# Patient Record
Sex: FEMALE | Race: WHITE | NOT HISPANIC OR LATINO | Employment: FULL TIME | URBAN - METROPOLITAN AREA
[De-identification: names, ages, dates, MRNs, and addresses within clinical notes are randomized per-mention and may not be internally consistent; named-entity substitution may affect disease eponyms.]

---

## 2022-10-19 ENCOUNTER — TRANSFERRED RECORDS (OUTPATIENT)
Dept: HEALTH INFORMATION MANAGEMENT | Facility: CLINIC | Age: 57
End: 2022-10-19

## 2022-10-19 LAB
ANION GAP SERPL CALC-SCNC: 7 MMOL/L (ref 5–15)
BUN SERPL-MCNC: 14 MG/DL (ref 8–23)
CALCIUM (EXTERNAL): 9.3 MG/DL (ref 8.5–10.5)
CHLORIDE (EXTERNAL): 104 MMOL/L (ref 98–107)
CO2 (EXTERNAL): 30 MMOL/L (ref 23–32)
CREATININE (EXTERNAL): 0.41 MG/DL (ref 0.7–1.2)
GFR ESTIMATED (EXTERNAL): >60 ML/MIN/1.73M2
GFR ESTIMATED (IF AFRICAN AMERICAN) (EXTERNAL): ABNORMAL ML/MIN/1.73M2
GLUCOSE (EXTERNAL): 182 MG/DL (ref 60–99)
HBA1C MFR BLD: 9.4 %
POTASSIUM (EXTERNAL): 4.2 MMOL/L (ref 3.5–5.1)
SODIUM (EXTERNAL): 141 MMOL/L (ref 136–145)

## 2023-01-16 ENCOUNTER — TRANSFERRED RECORDS (OUTPATIENT)
Dept: HEALTH INFORMATION MANAGEMENT | Facility: CLINIC | Age: 58
End: 2023-01-16

## 2023-01-16 LAB — HBA1C MFR BLD: 7.4 % (ref 4–5.6)

## 2023-01-23 ENCOUNTER — TRANSFERRED RECORDS (OUTPATIENT)
Dept: HEALTH INFORMATION MANAGEMENT | Facility: CLINIC | Age: 58
End: 2023-01-23

## 2023-01-26 DIAGNOSIS — E11.29 TYPE 2 DIABETES MELLITUS WITH OTHER DIABETIC KIDNEY COMPLICATION (H): Primary | ICD-10-CM

## 2023-01-26 RX ORDER — LISINOPRIL 2.5 MG/1
2.5 TABLET ORAL DAILY
COMMUNITY

## 2023-02-01 ENCOUNTER — HOSPITAL ENCOUNTER (OUTPATIENT)
Dept: EDUCATION SERVICES | Facility: HOSPITAL | Age: 58
Discharge: HOME | End: 2023-02-01
Attending: NURSE PRACTITIONER | Admitting: NURSE PRACTITIONER
Payer: COMMERCIAL

## 2023-02-01 ENCOUNTER — TRANSFERRED RECORDS (OUTPATIENT)
Dept: HEALTH INFORMATION MANAGEMENT | Facility: CLINIC | Age: 58
End: 2023-02-01

## 2023-02-01 VITALS
WEIGHT: 184.1 LBS | DIASTOLIC BLOOD PRESSURE: 74 MMHG | RESPIRATION RATE: 16 BRPM | BODY MASS INDEX: 30.67 KG/M2 | HEIGHT: 65 IN | HEART RATE: 87 BPM | OXYGEN SATURATION: 98 % | SYSTOLIC BLOOD PRESSURE: 124 MMHG

## 2023-02-01 DIAGNOSIS — E11.29 TYPE 2 DIABETES MELLITUS WITH OTHER DIABETIC KIDNEY COMPLICATION (H): ICD-10-CM

## 2023-02-01 LAB — RETINOPATHY: NORMAL

## 2023-02-01 PROCEDURE — G0108 DIAB MANAGE TRN  PER INDIV: HCPCS

## 2023-02-01 ASSESSMENT — PAIN SCALES - GENERAL
PAINLEVEL: MODERATE PAIN (5)
PAINLEVEL: EXTREME PAIN (9)

## 2023-02-01 NOTE — LETTER
"    2/1/2023    Luba Mace PA-C      RE: Alysha Galeas  [address] [facility] [address]        Diabetes Self-Management Education & Support    Presents for: Individual review    Type of Service: In Person Visit    Assessment Type:   ASSESSMENT:  Sonia, 57 year old, comes to [facility], for education updates. Has been since 2005-6 since last education with a diabetes educator. Most interest with diet, reviewed carbs and meal recommendation. Sonia would like more in depth education with Diabetes Dietician- appt set up today as pt requests. Referal for insulin adjustment. Upon review, continue current doses for now. Need additional readings for recommendations.     A1C in October 10/19/2023. Most recent 1/23 was 7.4%.  Recently in ICU for LOC. Psychogenic nonepileptic seizure. Encephalopathy, metabolic.   On FMLA, .  . 1 biological child. 2 step children.   MH- major depressive disorder, anxiety. Has  professional support to est 2/8/2023. Is currently on short term disability.  Recently had upper and lower scopes. Has a \"stomach emptying test\" next week.      Tiffani-En-Y 1987 (per notes from [facility], sent for scanning).  Wt Readings from Last 10 Encounters:   02/01/23 83.5 kg (184 lb 1.6 oz)     Levemir 24 units daily.   Fiasp with meal time per correction:   <150- no insulin  151-200 3 units  201-250  5 Units   251-300 8 units  301-350 10 units  351-400 12 units  >400 15 units     Has taken Tresiba in the past, didn't seem to control glucose as well as Levemir- per patient. Tried Trulicity, Actos, MOORE-doesn't remember which medication. Has had Metformin when first diagnoised- hasn't been taking for quite a while.     Monitoring: no meter today, forgot to bring today. FBG this morning was 124. Interested in Freestyle Russell, pt will look into zay compatibility. [facility] RN will send order for patient once determined. Concerned for cost, provided e-voucher [employer] Has occasional lows, keeps " "candies and butterscotch chips close.     Eye exam- had today. Was told \"Background retinopathy or stage 1\". Returns 7/26/2023.  Neuropathy in feet and hands. Areas on torso is numb. Has pain in feet.    Activity- working with PT. Has physical limitations and pain in feet.     /74. On ASA. No Statin- reaction      Allergies   Allergen Reactions     Fentanyl Shortness Of Breath     Morphine Shortness Of Breath     Atorvastatin Muscle Pain (Myalgia)     Statins [Hmg-Coa-R Inhibitors] Swelling     Swelling of lip/tongue/throat     Diazepam Anxiety     Patient's most recent No results found for: A1C, HEMOGLOBINA1  is not meeting goal of <7.0  1/23/2023: 7.5%. Down from 9.4% October 2022.     DRC RN will follow up with patient next week.     Diabetes knowledge and skills assessment:   Patient is knowledgeable in diabetes management concepts related to: Healthy Eating, Monitoring and Taking Medication  Continue education with the following diabetes management concepts: Healthy Eating, Being Active, Monitoring, Taking Medication, Problem Solving, Reducing Risks and Healthy Coping  Based on learning assessment above, most appropriate setting for further diabetes education would be: Individual setting.      PLAN    Monitoring:  A1C review:  Your A1C was 7.4 on 1/23/23. Goal is less than 7.5%  Next A1C: After April 23.     Check blood sugars 3-4x/day. Fasting in the morning or 2 hours after your largest meal are good times to check. Bedtime.   Target blood sugar: Fasting or before meals target is . 2 hours after meal <180.      CGM: Russell 2 vs Russell 3. Checks zay compatibility there are different apps for Russell 2 and Russell 3  Check pharmacy availability.      Let me know which you prefer and I will send in the order for you.      E-voucher   [phone]   Ask for e-voucher for the Russell 2 or 3. They will email you the voucher. Print and bring to the pharmacy.      Vouchers are good for 1 year. Will need to call " January for the next year to request a new voucher.      Medications:   Continue: Levemir and Fiasp.      Healthy Eating:  Limit higher carbohydrate foods such as: rice, breads, cereal, pasta, sweets. Avoid sugary drinks.  Good snack examples: meat, cheese, cottage cheese, nuts, hard boiled egg, veggies, fruit/berries, yogurt (Greek yogurt/protein).      Activity/Exercise:   Increase exercise as tolerated, even multiple short times during your day helps. - chair exercises as tolerated.   Adult goal is 150 minutes/week =  30 minutes 5 days of the week.   Weight: 184#     Foot exam: yearly.   [facility] requested.       Follow up:    DRC RN to follow up next week to review plan and glucose.  Dietitian: Pt set up with Elvia 3/1/2023 at 1030 am for MNT.      Please bring your meter to your appointment.   If you have any questions or concerns, please call me at [phone] or send [email or web address] message.     Topics to cover at upcoming visits: Healthy Eating, Being Active, Monitoring, Taking Medication, Problem Solving, Reducing Risks and Healthy Coping  See Care Plan for co-developed, patient-state behavior change goals.  AVS provided for patient today.    Education Materials Provided:  My Meal Plan- review of diet    SUBJECTIVE/OBJECTIVE:  Presents for: Individual review  Accompanied by: Self  Diabetes education in the past 24mo: No (2005-06? last education)  Focus of Visit: Other (wondering if any changes in diabetes management, diet.)  Diabetes type: Type 2  Date of diagnosis: 2000  Disease course: Getting harder to manage  How confident are you filling out medical forms by yourself:: Somewhat  Diabetes management related comments/concerns: hasn't had dm ed 2005-6  Transportation concerns: No  Difficulty affording diabetes medication?: No  Difficulty affording diabetes testing supplies?: No  Other concerns:: Glasses  Cultural Influences/Ethnic Background:  Not  or  "//    Diabetes Symptoms & Complications:  Fatigue: Yes  Neuropathy: Yes (going up legs, to mid-calf. area on back R side below shoulder/down wraps under R breast/side.right hand-feels like 3 fingers electric bolts/lightenting stabbing pain . thumbs - tingles.  toes numb. little toe not move. feet feels like walking on nails.)  Polydipsia: Sometimes (improved since A1C is down.)  Polyphagia: No  Polyuria: Sometimes (when sugars higher and drinking more water.)  Visual change: Yes (eye exam done, today)  Slow healing wounds: No (numbness in feet. ba on scalp has medicaiton for. checks feet.)  Symptom course: Stable  Weight trend: Fluctuating  Complications assessed today?: Yes  Autonomic neuropathy: Other (being worked up currently.)  CVA: No  Heart disease: No (mom had heart disease. paternal gma stroke)  Nephropathy: No  Peripheral neuropathy: Yes (mother-)  Peripheral Vascular Disease: No  Retinopathy: Yes (exam today. [facility].)    Patient Problem List an at d Family Medical History reviewed for relevant medical history, current medical status, and diabetes risk factors.    Vitals:  /74   Pulse 87   Resp 16   Ht 1.645 m (5' 4.75\")   Wt 83.5 kg (184 lb 1.6 oz)   SpO2 98%   BMI 30.87 kg/m    Estimated body mass index is 30.87 kg/m  as calculated from the following:    Height as of this encounter: 1.645 m (5' 4.75\").    Weight as of this encounter: 83.5 kg (184 lb 1.6 oz).   Last 3 BP:   BP Readings from Last 3 Encounters:   02/01/23 124/74       History   Smoking Status     Never   Smokeless Tobacco     Never       Labs:  No results found for: A1C, HEMOGLOBINA1  No results found for: GLC  No results found for: LDL  No results found for: HDL]  No results found for: GFRESTIMATED  No results found for: GFRESTBLACK  No results found for: CR  No results found for: MICROALBUMIN  Healthy Eating:  Healthy Eating Assessed Today: Yes  Cultural/Jainism diet restrictions?: No  Meal planning/habits: Carb " counting, Avoiding sweets, Low salt (carbs- 45-60.)  How many times a week on average do you eat food made away from home (restaurant/take-out)?: 1  Meals include: Breakfast, Lunch, Dinner  Breakfast: oatmeal. or piece of toast wit PB- yogurt.  Lunch: when working- ramen noodle. can of soup. complete meals. more protein based meals.  Dinner: soup. or  spahgetti-os.  Other: Premier protien- shakes previously, cutting back. spouse cooks/works afternoons.  Beverages: Water, Other (cutting out caffeine.ocena spray- zero sugar diluted with water.)  Has patient met with a dietitian in the past?: Yes    Being Active:  Being Active Assessed Today: Yes  Exercise:: Unable to exercise  Barrier to exercise: Physical limitation, Safety    Monitoring:  Monitoring Assessed Today: Yes  Did patient bring glucose meter to appointment? : No  Times checking blood sugar at home (number): 2 (working to 4 times a day. would like cgm.)  Times checking blood sugar at home (per): Day  Blood glucose trend: Fluctuating    Taking Medications:  Diabetes Medication(s)     Insulin       Insulin Aspart, w/Niacinamide, (FIASP SC)    Sliding scale     insulin detemir (LEVEMIR PEN) 100 UNIT/ML pen    Inject 24 Units Subcutaneous At Bedtime          Taking Medication Assessed Today: Yes  Current Treatments: Insulin Injections (Levemir- 24 units. Fiasp)  Dose schedule: Pre-breakfast, Pre-lunch, Pre-dinner, At bedtime  Given by: Patient  Injection/Infusion sites: Abdomen  Problems taking diabetes medications regularly?: No  Diabetes medication side effects?: Yes (occasional low - insulin.)    Problem Solving:  Problem Solving Assessed Today: Yes  Is the patient at risk for hypoglycemia?: Yes  Hypoglycemia Frequency: Monthly (2-3 times.  sx at 78.)  Hypoglycemia Treatment: Candy, Glucose (tablets or gel)  Does patient have glucagon emergency kit?: No    Hypoglycemia symptoms  Confusion: Yes  Dizziness or Light-Headedness: Yes    Hypoglycemia  Complications  Nocturnal hypoglycemia: Yes    Reducing Risks:  Reducing Risks Assessed Today: Yes  Diabetes Risks: Age over 45 years, Sedentary Lifestyle (1 biological, 2 step children.)  CAD Risks: Diabetes Mellitus, Family history, Obesity, Sedentary lifestyle, Stress  Has dilated eye exam at least once a year?: Yes  Sees dentist every 6 months?: No (dentures.)  Feet checked by healthcare provider in the last year?: Yes    Healthy Coping:  Healthy Coping Assessed Today: Yes  Emotional response to diabetes: Ready to learn, Acceptance  Informal Support system:: Partner  Stage of change: ACTION (Actively working towards change)  Patient Activation Measure Survey Score:  No flowsheet data found.      Care Plan and Education Provided:  Care Plan: Diabetes   Updates made by Stephanie Arthur RN since 2/6/2023 12:00 AM      Problem: Diabetes Self-Management Education Needed to Optimize Self-Care Behaviors       Goal: Understand diabetes pathophysiology and disease progression    This Visit's Progress: 100%      Task: Provide education on diabetes pathophysiology and disease progression specfic to patient's diabetes type Completed 2/6/2023   Responsible User: Stephanie Arthur RN      Goal: Healthy Eating - follow a healthy eating pattern for diabetes    Note:    Pt is following a 45-60 gram carb diet.      Task: Provide education on heart healthy eating Completed 2/6/2023   Responsible User: Stephanie Arthur RN      Goal: Being Active - get regular physical activity, working up to at least 150 minutes per week    This Visit's Progress: 50%      Task: Provide education on relationship of activity to glucose and precautions to take if at risk for low glucose Completed 2/6/2023   Responsible User: Stephanie Arthur RN      Task: Discuss barriers to physical activity with patient Completed 2/6/2023   Responsible User: Stephanie Arthur RN      Goal: Monitoring - monitor glucose and ketones as directed    This Visit's Progress: 100%    Note:    Tests 3-4 times daily, insulin correction scale before meals.   Interested in CGM/Freestyle.       Goal: Taking Medication - patient is consistently taking medications as directed    This Visit's Progress: On track      Task: Provide education on frequency and refill details of medications Completed 2/6/2023   Responsible User: Stephanie Arthur RN      Goal: Problem Solving - know how to prevent and manage short-term diabetes complications    This Visit's Progress: 50%      Task: Provide education on high blood glucose - causes, signs/symptoms, prevention and treatment Completed 2/6/2023   Responsible User: Stephanie Arthur RN      Task: Provide education on low blood glucose - causes, signs/symptoms, prevention, treatment, carrying a carbohydrate source at all times, and medical identification Completed 2/6/2023   Responsible User: Stephanie Arthur RN      Task: Provide education on how to care for diabetes on sick days Completed 2/6/2023   Responsible User: Stephanie Arthur RN      Task: Provide education on when to call a health care provider Completed 2/6/2023   Responsible User: Stephanie Arthur RN      Goal: Reducing Risks - know how to prevent and treat long-term diabetes complications    Note:    No tobacco use.  Improving A1C 9.4 to 7.4 recently.   Eye exam, current. Seen today.   Regular foot exams        Task: Provide education on recommended care for dental, eye and foot health Completed 2/6/2023   Responsible User: Stephanie Arthur RN      Task: Provide education on Hemoglobin A1c - goals and relationship to blood glucose levels Completed 2/6/2023   Responsible User: Stephanie Arthur RN      Goal: Healthy Coping - use available resources to cope with the challenges of managing diabetes    This Visit's Progress: 90%   Note:    Sees  professional.        Task: Discuss recognizing feelings about having diabetes Completed 2/6/2023   Responsible User: Stephanie Arthur RN      Task: Provide education on the  benefits of making appropriate lifestyle changes Completed 2/6/2023   Responsible User: Stephanie Arthur RN      Task: Provide education on benefits of utilizing support systems Completed 2/6/2023   Responsible User: Stephanie Arthur RN      Task: Provide education on when to seek professional counseling Completed 2/6/2023   Responsible User: Stephanie Arthur RN          Time Spent: 60 minutes  Encounter Type: Individual    Any diabetes medication dose changes were made via the CDE Protocol per the patient's primary care provider. A copy of this encounter was shared with the provider.Stephanie Arthur RN Diabetes Educator,  [phone]          Sincerely,        Stephanie Arthur RN

## 2023-02-01 NOTE — PROGRESS NOTES
"Diabetes Self-Management Education & Support    Presents for: Individual review    Type of Service: In Person Visit    Assessment Type:   ASSESSMENT:  Sonia, 57 year old, comes to [facility], for education updates. Has been since 2005-6 since last education with a diabetes educator. Most interest with diet, reviewed carbs and meal recommendation. Sonia would like more in depth education with Diabetes Dietician- appt set up today as pt requests. Referal for insulin adjustment. Upon review, continue current doses for now. Need additional readings for recommendations.     A1C in October 10/19/2023. Most recent 1/16/2023 was 7.4%.  Recently in ICU for LOC. Psychogenic nonepileptic seizure. Encephalopathy, metabolic.   On FMLA, .  . 1 biological child. 2 step children.   MH- major depressive disorder, anxiety. Has  professional support to est 2/8/2023. Is currently on short term disability.  Recently had upper and lower scopes. Has a \"stomach emptying test\" next week.      Tiffani-En-Y 1987 (per notes from [facility], sent for scanning).  Wt Readings from Last 10 Encounters:   02/01/23 83.5 kg (184 lb 1.6 oz)     Levemir 24 units daily.   Fiasp with meal time per correction:   <150- no insulin  151-200 3 units  201-250  5 Units   251-300 8 units  301-350 10 units  351-400 12 units  >400 15 units     Has taken Tresiba in the past, didn't seem to control glucose as well as Levemir- per patient. Tried Trulicity, Actos, MOORE-doesn't remember which medication. Has had Metformin when first diagnoised- hasn't been taking for quite a while.     Monitoring: no meter today, forgot to bring today. FBG this morning was 124. Interested in Freestyle Russell, pt will look into zay compatibility. [facility] RN will send order for patient once determined. Concerned for cost, provided e-HealthID Profile [facility]. Has occasional lows, keeps candies and butterscotch chips close.     Eye exam- had today. Was told \"Background retinopathy or stage 1\". " Returns 7/26/2023.  Neuropathy in feet and hands. Areas on torso is numb. Has pain in feet.    Activity- working with PT. Has physical limitations and pain in feet.     /74. On ASA. No Statin- reaction      Allergies   Allergen Reactions     Fentanyl Shortness Of Breath     Morphine Shortness Of Breath     Atorvastatin Muscle Pain (Myalgia)     Statins [Hmg-Coa-R Inhibitors] Swelling     Swelling of lip/tongue/throat     Diazepam Anxiety     Patient's most recent No results found for: A1C, HEMOGLOBINA1  is not meeting goal of <7.0  1/23/2023: 7.5%. Down from 9.4% October 2022.     DRC RN will follow up with patient next week.     Diabetes knowledge and skills assessment:   Patient is knowledgeable in diabetes management concepts related to: Healthy Eating, Monitoring and Taking Medication  Continue education with the following diabetes management concepts: Healthy Eating, Being Active, Monitoring, Taking Medication, Problem Solving, Reducing Risks and Healthy Coping  Based on learning assessment above, most appropriate setting for further diabetes education would be: Individual setting.      PLAN    Monitoring:  A1C review:  Your A1C was 7.4 on 1/16/23. Goal is less than 7.5%  Next A1C: After April 16.      Check blood sugars 3-4x/day. Fasting in the morning or 2 hours after your largest meal are good times to check. Bedtime.   Target blood sugar: Fasting or before meals target is . 2 hours after meal <180.      CGM: Russell 2 vs Russell 3. Checks zay compatibility there are different apps for Russell 2 and Russell 3  Check pharmacy availability.      Let me know which you prefer and I will send in the order for you.      E-voucher   [phone]   Ask for e-voucher for the Russell 2 or 3. They will email you the voucher. Print and bring to the pharmacy.      Vouchers are good for 1 year. Will need to call January for the next year to request a new voucher.      Medications:   Continue: Levemir and Fiasp.       Healthy Eating:  Limit higher carbohydrate foods such as: rice, breads, cereal, pasta, sweets. Avoid sugary drinks.  Good snack examples: meat, cheese, cottage cheese, nuts, hard boiled egg, veggies, fruit/berries, yogurt (Greek yogurt/protein).      Activity/Exercise:   Increase exercise as tolerated, even multiple short times during your day helps. - chair exercises as tolerated.   Adult goal is 150 minutes/week =  30 minutes 5 days of the week.   Weight: 184#     Foot exam: yearly.   Eye exam: yearly.- today, [facility]. Report requested.       Follow up:    DRC RN to follow up next week to review plan and glucose.  Dietitian: Pt set up with Elvia 3/1/2023 at 1030 am for MNT.      Please bring your meter to your appointment.   If you have any questions or concerns, please call me at [phone] or send [employer] message.     Topics to cover at upcoming visits: Healthy Eating, Being Active, Monitoring, Taking Medication, Problem Solving, Reducing Risks and Healthy Coping  See Care Plan for co-developed, patient-state behavior change goals.  AVS provided for patient today.    Education Materials Provided:  My Meal Plan- review of diet    SUBJECTIVE/OBJECTIVE:  Presents for: Individual review  Accompanied by: Self  Diabetes education in the past 24mo: No (2005-06? last education)  Focus of Visit: Other (wondering if any changes in diabetes management, diet.)  Diabetes type: Type 2  Date of diagnosis: 2000  Disease course: Getting harder to manage  How confident are you filling out medical forms by yourself:: Somewhat  Diabetes management related comments/concerns: hasn't had dm ed 2005-6  Transportation concerns: No  Difficulty affording diabetes medication?: No  Difficulty affording diabetes testing supplies?: No  Other concerns:: Glasses  Cultural Influences/Ethnic Background:  Not  or //    Diabetes Symptoms & Complications:  Fatigue: Yes  Neuropathy: Yes (going up legs, to mid-calf. area on  "back R side below shoulder/down wraps under R breast/side.right hand-feels like 3 fingers electric bolts/lightenting stabbing pain . thumbs - tingles.  toes numb. little toe not move. feet feels like walking on nails.)  Polydipsia: Sometimes (improved since A1C is down.)  Polyphagia: No  Polyuria: Sometimes (when sugars higher and drinking more water.)  Visual change: Yes (eye exam done, today)  Slow healing wounds: No (numbness in feet. ba on scalp has medicaiton for. checks feet.)  Symptom course: Stable  Weight trend: Fluctuating  Complications assessed today?: Yes  Autonomic neuropathy: Other (being worked up currently.)  CVA: No  Heart disease: No (mom had heart disease. paternal gma stroke)  Nephropathy: No  Peripheral neuropathy: Yes (mother-)  Peripheral Vascular Disease: No  [facility]: [facility]    Patient Problem List an at d Family Medical History reviewed for relevant medical history, current medical status, and diabetes risk factors.    Vitals:  /74   Pulse 87   Resp 16   Ht 1.645 m (5' 4.75\")   Wt 83.5 kg (184 lb 1.6 oz)   SpO2 98%   BMI 30.87 kg/m    Estimated body mass index is 30.87 kg/m  as calculated from the following:    Height as of this encounter: 1.645 m (5' 4.75\").    Weight as of this encounter: 83.5 kg (184 lb 1.6 oz).   Last 3 BP:   BP Readings from Last 3 Encounters:   02/01/23 124/74       History   Smoking Status     Never   Smokeless Tobacco     Never       Labs:  No results found for: A1C, HEMOGLOBINA1  No results found for: GLC  No results found for: LDL  No results found for: HDL]  No results found for: GFRESTIMATED  No results found for: GFRESTBLACK  No results found for: CR  No results found for: MICROALBUMIN  Healthy Eating:  Healthy Eating Assessed Today: Yes  Cultural/Pentecostalism diet restrictions?: No  Meal planning/habits: Carb counting, Avoiding sweets, Low salt (carbs- 45-60.)  How many times a week on average do you eat food made away " from home (restaurant/take-out)?: 1  Meals include: Breakfast, Lunch, Dinner  Breakfast: oatmeal. or piece of toast wit PB- yogurt.  Lunch: when working- ramen noodle. can of soup. complete meals. more protein based meals.  Dinner: soup. or  spahgetti-os.  Other: Premier protien- shakes previously, cutting back. spouse cooks/works afternoons.  Beverages: Water, Other (cutting out caffeine.ocena spray- zero sugar diluted with water.)  Has patient met with a dietitian in the past?: Yes    Being Active:  Being Active Assessed Today: Yes  Exercise:: Unable to exercise  Barrier to exercise: Physical limitation, Safety    Monitoring:  Monitoring Assessed Today: Yes  Did patient bring glucose meter to appointment? : No  Times checking blood sugar at home (number): 2 (working to 4 times a day. would like cgm.)  Times checking blood sugar at home (per): Day  Blood glucose trend: Fluctuating    Taking Medications:  Diabetes Medication(s)     Insulin       Insulin Aspart, w/Niacinamide, (FIASP SC)    Sliding scale     insulin detemir (LEVEMIR PEN) 100 UNIT/ML pen    Inject 24 Units Subcutaneous At Bedtime          Taking Medication Assessed Today: Yes  Current Treatments: Insulin Injections (Levemir- 24 units. Fiasp)  Dose schedule: Pre-breakfast, Pre-lunch, Pre-dinner, At bedtime  Given by: Patient  Injection/Infusion sites: Abdomen  Problems taking diabetes medications regularly?: No  Diabetes medication side effects?: Yes (occasional low - insulin.)    Problem Solving:  Problem Solving Assessed Today: Yes  Is the patient at risk for hypoglycemia?: Yes  Hypoglycemia Frequency: Monthly (2-3 times.  sx at 78.)  Hypoglycemia Treatment: Candy, Glucose (tablets or gel)  Does patient have glucagon emergency kit?: No    Hypoglycemia symptoms  Confusion: Yes  Dizziness or Light-Headedness: Yes    Hypoglycemia Complications  Nocturnal hypoglycemia: Yes    Reducing Risks:  Reducing Risks Assessed Today: Yes  Diabetes Risks: Age over  45 years, Sedentary Lifestyle (1 biological, 2 step children.)  CAD Risks: Diabetes Mellitus, Family history, Obesity, Sedentary lifestyle, Stress  Has dilated eye exam at least once a year?: Yes  Sees dentist every 6 months?: No (dentures.)  Feet checked by healthcare provider in the last year?: Yes    Healthy Coping:  Healthy Coping Assessed Today: Yes  Emotional response to diabetes: Ready to learn, Acceptance  Informal Support system:: Partner  Stage of change: ACTION (Actively working towards change)  Patient Activation Measure Survey Score:  No flowsheet data found.      Care Plan and Education Provided:  Care Plan: Diabetes   Updates made by Stephanie Arthur RN since 2/6/2023 12:00 AM      Problem: Diabetes Self-Management Education Needed to Optimize Self-Care Behaviors       Goal: Understand diabetes pathophysiology and disease progression    This Visit's Progress: 100%      Task: Provide education on diabetes pathophysiology and disease progression specfic to patient's diabetes type Completed 2/6/2023   Responsible User: Stephanie Arthur RN      Goal: Healthy Eating - follow a healthy eating pattern for diabetes    Note:    Pt is following a 45-60 gram carb diet.      Task: Provide education on heart healthy eating Completed 2/6/2023   Responsible User: Stephanie Arthur RN      Goal: Being Active - get regular physical activity, working up to at least 150 minutes per week    This Visit's Progress: 50%      Task: Provide education on relationship of activity to glucose and precautions to take if at risk for low glucose Completed 2/6/2023   Responsible User: Stephanie Arthur RN      Task: Discuss barriers to physical activity with patient Completed 2/6/2023   Responsible User: Stephanie Arthur RN      Goal: Monitoring - monitor glucose and ketones as directed    This Visit's Progress: 100%   Note:    Tests 3-4 times daily, insulin correction scale before meals.   Interested in CGM/Freestyle.       Goal: Taking  Medication - patient is consistently taking medications as directed    This Visit's Progress: On track      Task: Provide education on frequency and refill details of medications Completed 2/6/2023   Responsible User: Stephanei Arthur RN      Goal: Problem Solving - know how to prevent and manage short-term diabetes complications    This Visit's Progress: 50%      Task: Provide education on high blood glucose - causes, signs/symptoms, prevention and treatment Completed 2/6/2023   Responsible User: Stephanie Arthur RN      Task: Provide education on low blood glucose - causes, signs/symptoms, prevention, treatment, carrying a carbohydrate source at all times, and medical identification Completed 2/6/2023   Responsible User: Stephanie Arthur RN      Task: Provide education on how to care for diabetes on sick days Completed 2/6/2023   Responsible User: Stephanie Arthur RN      Task: Provide education on when to call a health care provider Completed 2/6/2023   Responsible User: Stephanie Arthur RN      Goal: Reducing Risks - know how to prevent and treat long-term diabetes complications    Note:    No tobacco use.  Improving A1C 9.4 to 7.4 recently.   Eye exam, current. Seen today.   Regular foot exams        Task: Provide education on recommended care for dental, eye and foot health Completed 2/6/2023   Responsible User: Stephanie Arthur RN      Task: Provide education on Hemoglobin A1c - goals and relationship to blood glucose levels Completed 2/6/2023   Responsible User: Stephanie Arthur RN      Goal: Healthy Coping - use available resources to cope with the challenges of managing diabetes    This Visit's Progress: 90%   Note:    Sees  professional.        Task: Discuss recognizing feelings about having diabetes Completed 2/6/2023   Responsible User: Stephanie Arthur RN      Task: Provide education on the benefits of making appropriate lifestyle changes Completed 2/6/2023   Responsible User: Stpehanie Arthur RN      Task: Provide  education on benefits of utilizing support systems Completed 2/6/2023   Responsible User: Stephanie Arthur RN      Task: Provide education on when to seek professional counseling Completed 2/6/2023   Responsible User: Stephanie Arthur RN          Time Spent: 60 minutes  Encounter Type: Individual    Any diabetes medication dose changes were made via the CDE Protocol per the patient's primary care provider. A copy of this encounter was shared with the provider.Stephanie Arthur RN Diabetes Educator,  [phone]

## 2023-02-01 NOTE — PATIENT INSTRUCTIONS
Recap of our visit:     Monitoring:  A1C review:  Your A1C was 7.4 on 1/16/23. Goal is less than 7.5%  Next A1C: After April 16.    Check blood sugars 3-4x/day. Fasting in the morning or 2 hours after your largest meal are good times to check. Bedtime.   Target blood sugar: Fasting or before meals target is . 2 hours after meal <180.     CGM: Russell 2 vs Russell 3. Checks zay compatibility there are different apps for Russell 2 and Russell 3  Check pharmacy availability.     Let me know which you prefer and I will send in the order for you.      E-voucher   [phone]   Ask for e-voucher for the Russell 2 or 3. They will email you the voucher. Print and bring to the pharmacy.     Vouchers are good for 1 year. Will need to call January for the next year to request a new voucher.     Medications:   Continue: Levemir and Fiasp.     Healthy Eating:  Limit higher carbohydrate foods such as: rice, breads, cereal, pasta, sweets. Avoid sugary drinks.  Good snack examples: meat, cheese, cottage cheese, nuts, hard boiled egg, veggies, fruit/berries, yogurt (Greek yogurt/protein).     Activity/Exercise:   Increase exercise as tolerated, even multiple short times during your day helps. - chair exercises as tolerated.   Adult goal is 150 minutes/week =  30 minutes 5 days of the week.   Weight: 184#    Foot exam: yearly, visit today. - current.   Eye exam: yearly.     Follow up:    Elvia and Deandra are dietitians.     Please bring your meter to your appointment.     If you have any questions or concerns, please call me at [phone] or send [employer] message.    Thank you for coming in today!  Stephanie Arthur, RN Diabetes Educator

## 2023-02-07 ENCOUNTER — PATIENT OUTREACH (OUTPATIENT)
Dept: EDUCATION SERVICES | Facility: HOSPITAL | Age: 58
End: 2023-02-07

## 2023-02-07 NOTE — PROGRESS NOTES
Diabetes Self-Management Education & Support    Follow up with patient: BG and CGM plan.   Offer DRC visit in April after the 16th to recheck A1C.     Levemir 24 units daily.     Fiasp with meal time per correction:   <150- no insulin  151-200 3 units  201-250  5 Units   251-300 8 units  301-350 10 units  351-400 12 units  >400 15 units    Message left for return call.   Stephanie Arthur RN Diabetes Educator,  [phone]  2/7/2023 at 10:24 AM

## 2023-02-08 NOTE — PROGRESS NOTES
Spoke with patient, she is driving using hands free so able to talk.     Kept brief considering she is driving.   CGM- pt was going to check into the CGM /Russell 2 or 3 compatibility and availability. Has not done so yet- wants to use up her meter supplies. She will call when she is ready for the CGM.     Reports FBG range 110-158. Wants to stay with Levemir at 24 units daily.    Pt declines scheduling visit in April for A1C, has appointment with PCP and will check at that time.     Pt plans to keep appointment with Elvia LIPSCOMB in March.     Sonia has no other concerns at this time.   Stephanie Arthur, RN Diabetes Educator,  [phone]  2/8/2023 at 4:23 PM

## 2023-02-14 ENCOUNTER — TRANSFERRED RECORDS (OUTPATIENT)
Dept: OTHER | Facility: HOSPITAL | Age: 58
End: 2023-02-14